# Patient Record
Sex: MALE | Race: WHITE | URBAN - METROPOLITAN AREA
[De-identification: names, ages, dates, MRNs, and addresses within clinical notes are randomized per-mention and may not be internally consistent; named-entity substitution may affect disease eponyms.]

---

## 2022-09-28 ENCOUNTER — WEB ENCOUNTER (OUTPATIENT)
Dept: URBAN - METROPOLITAN AREA CLINIC 25 | Facility: CLINIC | Age: 52
End: 2022-09-28

## 2022-09-28 ENCOUNTER — CLAIMS CREATED FROM THE CLAIM WINDOW (OUTPATIENT)
Dept: URBAN - METROPOLITAN AREA CLINIC 25 | Facility: CLINIC | Age: 52
End: 2022-09-28
Payer: COMMERCIAL

## 2022-09-28 ENCOUNTER — DASHBOARD ENCOUNTERS (OUTPATIENT)
Age: 52
End: 2022-09-28

## 2022-09-28 VITALS
HEART RATE: 81 BPM | SYSTOLIC BLOOD PRESSURE: 135 MMHG | WEIGHT: 209 LBS | TEMPERATURE: 97.7 F | HEIGHT: 67 IN | BODY MASS INDEX: 32.8 KG/M2 | DIASTOLIC BLOOD PRESSURE: 84 MMHG

## 2022-09-28 DIAGNOSIS — Z12.11 SCREENING COLONOSCOPY: ICD-10-CM

## 2022-09-28 DIAGNOSIS — Z87.19 HISTORY OF PANCREATITIS: ICD-10-CM

## 2022-09-28 DIAGNOSIS — R10.13 DYSPEPSIA: ICD-10-CM

## 2022-09-28 DIAGNOSIS — R10.13 EPIGASTRIC ABDOMINAL PAIN: ICD-10-CM

## 2022-09-28 DIAGNOSIS — R79.89 ELEVATED LFTS: ICD-10-CM

## 2022-09-28 PROCEDURE — 99204 OFFICE O/P NEW MOD 45 MIN: CPT | Performed by: INTERNAL MEDICINE

## 2022-09-28 RX ORDER — INSULIN DETEMIR 100 [IU]/ML
AS DIRECTED INJECTION, SOLUTION SUBCUTANEOUS
Status: ACTIVE | COMMUNITY

## 2022-09-28 RX ORDER — LORATADINE 10 MG
1 TABLET TABLET ORAL ONCE A DAY
Status: ACTIVE | COMMUNITY

## 2022-09-28 RX ORDER — INSULIN ASPART 100 [IU]/ML
AS DIRECTED INJECTION, SOLUTION INTRAVENOUS; SUBCUTANEOUS
Status: ACTIVE | COMMUNITY

## 2022-09-28 RX ORDER — POLYETHYLENE GLYCOL 3350, SODIUM SULFATE, SODIUM CHLORIDE, POTASSIUM CHLORIDE, ASCORBIC ACID, SODIUM ASCORBATE 140-9-5.2G
AS DIRECTED KIT ORAL AS DIRECTED
Qty: 1 BOX | Refills: 0 | OUTPATIENT
Start: 2022-09-28 | End: 2022-09-29

## 2022-09-28 RX ORDER — AMLODIPINE BESYLATE AND BENAZEPRIL HYDROCHLORIDE 10; 40 MG/1; MG/1
AS DIRECTED CAPSULE ORAL
Status: ACTIVE | COMMUNITY

## 2022-09-28 RX ORDER — GLIMEPIRIDE 2 MG/1
1 TABLET WITH BREAKFAST OR THE FIRST MAIN MEAL OF THE DAY TABLET ORAL ONCE A DAY
Status: ACTIVE | COMMUNITY

## 2022-09-28 RX ORDER — ROSUVASTATIN CALCIUM 10 MG/1
1 TABLET TABLET, FILM COATED ORAL ONCE A DAY
Status: ACTIVE | COMMUNITY

## 2022-09-28 RX ORDER — METFORMIN HYDROCHLORIDE 1000 MG/1
1 TABLET WITH A MEAL TABLET, FILM COATED ORAL ONCE A DAY
Status: ACTIVE | COMMUNITY

## 2022-09-28 RX ORDER — EMPAGLIFLOZIN 25 MG/1
1 TABLET TABLET, FILM COATED ORAL ONCE A DAY
Status: ACTIVE | COMMUNITY

## 2022-09-28 NOTE — HPI-TODAY'S VISIT:
Patient referred by Dr. Lavell Shaw for pancreatitis.  A copy of this report will be sent to the referring provider.  He was diagnosed with DM in 2005.  In 2008 he had severe acute pancreatitis.  He had necrotizing pancreatitis.  He had a distal pancreatectomy/CCY/splenectomy.  More recently his Endocrine rechecked him and found that he was producing insulin.  Apparently recent Amylase/Lipase were elevated.  This was about 5 months ago.  Per the patient, recheck in July showed persistent elevated amylase/lipase.  Review of labs from 7/2022 show mildly elevated Alk phos and amylase.  Lipase was about 220.  His Hgb A1c was 7%.  He has a mild dull upp abdominal pian.  He feels distended.  He has been under a lot of stress.  He has a decreased appetite.  His weight is stable.  He has ahd some slightly more GERD then usual and also some nausea.  He denies vomiting.  He denies dysphagia.  He denies LGI symptoms.  He denies LGI Bleed or melena.  He denies icterus or pruritis.  He has not had any recent imaging.  He denies regular NSAID's.  He denies regular alcohol.  His maternal grandfather ahd colon cancer and the maternal grandmother had gastric cancer.  His brother has had colon polyps.  He has not had prior colonsocopy

## 2022-10-07 ENCOUNTER — LAB OUTSIDE AN ENCOUNTER (OUTPATIENT)
Dept: URBAN - METROPOLITAN AREA CLINIC 84 | Facility: CLINIC | Age: 52
End: 2022-10-07

## 2022-10-11 ENCOUNTER — TELEPHONE ENCOUNTER (OUTPATIENT)
Dept: URBAN - METROPOLITAN AREA CLINIC 25 | Facility: CLINIC | Age: 52
End: 2022-10-11

## 2022-10-17 ENCOUNTER — TELEPHONE ENCOUNTER (OUTPATIENT)
Dept: URBAN - METROPOLITAN AREA CLINIC 25 | Facility: CLINIC | Age: 52
End: 2022-10-17

## 2022-11-07 ENCOUNTER — OFFICE VISIT (OUTPATIENT)
Dept: URBAN - METROPOLITAN AREA SURGERY CENTER 20 | Facility: SURGERY CENTER | Age: 52
End: 2022-11-07

## 2023-02-06 ENCOUNTER — OFFICE VISIT (OUTPATIENT)
Dept: URBAN - METROPOLITAN AREA SURGERY CENTER 20 | Facility: SURGERY CENTER | Age: 53
End: 2023-02-06